# Patient Record
Sex: FEMALE | Race: WHITE | NOT HISPANIC OR LATINO | URBAN - METROPOLITAN AREA
[De-identification: names, ages, dates, MRNs, and addresses within clinical notes are randomized per-mention and may not be internally consistent; named-entity substitution may affect disease eponyms.]

---

## 2018-09-11 ENCOUNTER — APPOINTMENT (OUTPATIENT)
Dept: URBAN - METROPOLITAN AREA CLINIC 282 | Age: 73
Setting detail: DERMATOLOGY
End: 2018-09-11

## 2018-09-11 DIAGNOSIS — Z71.89 OTHER SPECIFIED COUNSELING: ICD-10-CM

## 2018-09-11 DIAGNOSIS — L57.0 ACTINIC KERATOSIS: ICD-10-CM

## 2018-09-11 DIAGNOSIS — L82.1 OTHER SEBORRHEIC KERATOSIS: ICD-10-CM

## 2018-09-11 DIAGNOSIS — D18.0 HEMANGIOMA: ICD-10-CM

## 2018-09-11 DIAGNOSIS — Z12.83 ENCOUNTER FOR SCREENING FOR MALIGNANT NEOPLASM OF SKIN: ICD-10-CM

## 2018-09-11 PROBLEM — D18.01 HEMANGIOMA OF SKIN AND SUBCUTANEOUS TISSUE: Status: ACTIVE | Noted: 2018-09-11

## 2018-09-11 PROCEDURE — OTHER LIQUID NITROGEN: OTHER

## 2018-09-11 PROCEDURE — OTHER COUNSELING: OTHER

## 2018-09-11 PROCEDURE — 99214 OFFICE O/P EST MOD 30 MIN: CPT | Mod: 25

## 2018-09-11 PROCEDURE — OTHER MIPS QUALITY: OTHER

## 2018-09-11 PROCEDURE — 17000 DESTRUCT PREMALG LESION: CPT

## 2018-09-11 PROCEDURE — 17003 DESTRUCT PREMALG LES 2-14: CPT

## 2018-09-11 ASSESSMENT — LOCATION DETAILED DESCRIPTION DERM
LOCATION DETAILED: EPIGASTRIC SKIN
LOCATION DETAILED: LEFT INFERIOR CENTRAL MALAR CHEEK
LOCATION DETAILED: SUPERIOR THORACIC SPINE
LOCATION DETAILED: NASAL SUPRATIP
LOCATION DETAILED: INFERIOR THORACIC SPINE
LOCATION DETAILED: RIGHT UPPER CUTANEOUS LIP
LOCATION DETAILED: RIGHT INFERIOR MEDIAL MALAR CHEEK

## 2018-09-11 ASSESSMENT — LOCATION ZONE DERM
LOCATION ZONE: LIP
LOCATION ZONE: TRUNK
LOCATION ZONE: FACE
LOCATION ZONE: NOSE

## 2018-09-11 ASSESSMENT — LOCATION SIMPLE DESCRIPTION DERM
LOCATION SIMPLE: NOSE
LOCATION SIMPLE: RIGHT LIP
LOCATION SIMPLE: UPPER BACK
LOCATION SIMPLE: ABDOMEN
LOCATION SIMPLE: LEFT CHEEK
LOCATION SIMPLE: RIGHT CHEEK

## 2018-09-11 NOTE — PROCEDURE: LIQUID NITROGEN
Number Of Freeze-Thaw Cycles: 2 freeze-thaw cycles
Consent: The patient's consent was obtained including but not limited to risks of crusting, scabbing, blistering, scarring, darker or lighter pigmentary change, recurrence, incomplete removal and infection.
Detail Level: Detailed
Render Post-Care Instructions In Note?: no
Post-Care Instructions: I reviewed with the patient in detail post-care instructions. Patient is to wear sunprotection, and avoid picking at any of the treated lesions. Pt may apply Vaseline to crusted or scabbing areas.
Duration Of Freeze Thaw-Cycle (Seconds): 8

## 2019-09-12 ENCOUNTER — APPOINTMENT (OUTPATIENT)
Dept: URBAN - METROPOLITAN AREA CLINIC 282 | Age: 74
Setting detail: DERMATOLOGY
End: 2019-09-12

## 2019-09-12 DIAGNOSIS — L81.2 FRECKLES: ICD-10-CM

## 2019-09-12 DIAGNOSIS — D22 MELANOCYTIC NEVI: ICD-10-CM

## 2019-09-12 DIAGNOSIS — D18.0 HEMANGIOMA: ICD-10-CM

## 2019-09-12 DIAGNOSIS — L71.8 OTHER ROSACEA: ICD-10-CM

## 2019-09-12 DIAGNOSIS — L81.4 OTHER MELANIN HYPERPIGMENTATION: ICD-10-CM

## 2019-09-12 DIAGNOSIS — Z12.83 ENCOUNTER FOR SCREENING FOR MALIGNANT NEOPLASM OF SKIN: ICD-10-CM

## 2019-09-12 DIAGNOSIS — Z71.89 OTHER SPECIFIED COUNSELING: ICD-10-CM

## 2019-09-12 DIAGNOSIS — L82.1 OTHER SEBORRHEIC KERATOSIS: ICD-10-CM

## 2019-09-12 PROBLEM — D22.61 MELANOCYTIC NEVI OF RIGHT UPPER LIMB, INCLUDING SHOULDER: Status: ACTIVE | Noted: 2019-09-12

## 2019-09-12 PROBLEM — D18.01 HEMANGIOMA OF SKIN AND SUBCUTANEOUS TISSUE: Status: ACTIVE | Noted: 2019-09-12

## 2019-09-12 PROCEDURE — 99214 OFFICE O/P EST MOD 30 MIN: CPT

## 2019-09-12 PROCEDURE — OTHER REASSURANCE: OTHER

## 2019-09-12 PROCEDURE — OTHER TREATMENT REGIMEN: OTHER

## 2019-09-12 PROCEDURE — OTHER OTHER: OTHER

## 2019-09-12 PROCEDURE — OTHER PRESCRIPTION: OTHER

## 2019-09-12 PROCEDURE — OTHER COUNSELING: OTHER

## 2019-09-12 ASSESSMENT — LOCATION ZONE DERM
LOCATION ZONE: LEG
LOCATION ZONE: ARM
LOCATION ZONE: FACE
LOCATION ZONE: TRUNK

## 2019-09-12 ASSESSMENT — LOCATION DETAILED DESCRIPTION DERM
LOCATION DETAILED: LEFT PROXIMAL POSTERIOR UPPER ARM
LOCATION DETAILED: SUPERIOR LUMBAR SPINE
LOCATION DETAILED: LEFT LATERAL ABDOMEN
LOCATION DETAILED: LEFT KNEE
LOCATION DETAILED: RIGHT MEDIAL SUPERIOR CHEST
LOCATION DETAILED: RIGHT POSTERIOR SHOULDER
LOCATION DETAILED: LEFT INFERIOR MEDIAL MALAR CHEEK
LOCATION DETAILED: SUPERIOR THORACIC SPINE
LOCATION DETAILED: RIGHT PROXIMAL POSTERIOR UPPER ARM
LOCATION DETAILED: RIGHT INFERIOR MEDIAL MALAR CHEEK
LOCATION DETAILED: INFERIOR THORACIC SPINE

## 2019-09-12 ASSESSMENT — LOCATION SIMPLE DESCRIPTION DERM
LOCATION SIMPLE: UPPER BACK
LOCATION SIMPLE: CHEST
LOCATION SIMPLE: ABDOMEN
LOCATION SIMPLE: RIGHT SHOULDER
LOCATION SIMPLE: LEFT CHEEK
LOCATION SIMPLE: LEFT POSTERIOR UPPER ARM
LOCATION SIMPLE: RIGHT CHEEK
LOCATION SIMPLE: RIGHT POSTERIOR UPPER ARM
LOCATION SIMPLE: LOWER BACK
LOCATION SIMPLE: LEFT KNEE

## 2019-09-12 NOTE — PROCEDURE: OTHER
Detail Level: Zone
Note Text (......Xxx Chief Complaint.): This diagnosis correlates with the
Other (Free Text): Patient given written prescriptions

## 2022-10-10 NOTE — HPI: SKIN LESION
States good pain relief  
How Severe Is Your Skin Lesion?: mild
Is This A New Presentation, Or A Follow-Up?: Growth